# Patient Record
(demographics unavailable — no encounter records)

---

## 2025-07-14 NOTE — ASSESSMENT
[FreeTextEntry1] : epididymal cyst vs hydrocele reviewed with patient if epididymal cyst - risk obstructed epididymis on that side reviewed with patient understands imaging may not definitvely clarfy findings severe bother We reviewed the risks and benefits of hydrocelectomy today. He understands that this is a strictly elective procedure and that they hydrocele will not affect his life expectancy in any way. This surgery is performed to alleviate the discomfort associated with large hydroceles. Risks, including a risk of scrotal hematoma (common), infection, pain, and injury to the epididymis/vas deferens (rare) were discussed. He understands he is to avoid strenuous and sexual activity for approximately one month post op. The need for scrotal elevation/jock straps and ice in the post op period were reviewed. NSAIDs and sparing use of narcotic pain medication was discussed.  likely epididymal cyst will schedule

## 2025-07-14 NOTE — PHYSICAL EXAM
[Urethral Meatus] : meatus normal [Penis Abnormality] : normal circumcised penis [Urinary Bladder Findings] : the bladder was normal on palpation [Scrotum] : the scrotum was normal [de-identified] : large left hemiscrotum. cystic/fluid filled structure feels to be lateral to testis which seems separate. ?epididymal cyst

## 2025-07-14 NOTE — PHYSICAL EXAM
[Urethral Meatus] : meatus normal [Penis Abnormality] : normal circumcised penis [Urinary Bladder Findings] : the bladder was normal on palpation [Scrotum] : the scrotum was normal [de-identified] : large left hemiscrotum. cystic/fluid filled structure feels to be lateral to testis which seems separate. ?epididymal cyst

## 2025-07-14 NOTE — HISTORY OF PRESENT ILLNESS
[FreeTextEntry1] : 43M  15 year history hydrocele increasing bother scrotal ultrasound -- 10cm left hydrocele  No

## 2025-07-14 NOTE — HISTORY OF PRESENT ILLNESS
[FreeTextEntry1] : 43M  15 year history hydrocele increasing bother scrotal ultrasound -- 10cm left hydrocele